# Patient Record
Sex: FEMALE | Race: WHITE | NOT HISPANIC OR LATINO | URBAN - METROPOLITAN AREA
[De-identification: names, ages, dates, MRNs, and addresses within clinical notes are randomized per-mention and may not be internally consistent; named-entity substitution may affect disease eponyms.]

---

## 2018-07-19 ENCOUNTER — EMERGENCY (EMERGENCY)
Facility: HOSPITAL | Age: 23
LOS: 1 days | Discharge: HOME | End: 2018-07-19
Attending: EMERGENCY MEDICINE | Admitting: EMERGENCY MEDICINE
Payer: COMMERCIAL

## 2018-07-19 VITALS
SYSTOLIC BLOOD PRESSURE: 146 MMHG | HEART RATE: 83 BPM | DIASTOLIC BLOOD PRESSURE: 84 MMHG | TEMPERATURE: 98 F | OXYGEN SATURATION: 100 % | RESPIRATION RATE: 16 BRPM

## 2018-07-19 PROCEDURE — 99283 EMERGENCY DEPT VISIT LOW MDM: CPT

## 2018-07-19 NOTE — ED ADULT TRIAGE NOTE - CHIEF COMPLAINT QUOTE
Pt arrives c/o Chest pain/Pressure x 2 wks, w/ intermittent severity.  Notices worse w/ activity, improves w/ rest.  Pt denies PMHx or daily meds.  [facility] which told her was "questionable" and that pt at risk for blood clots 2/2 oral contraceptives and recent long car ride (14 hr trip to [place]) and to come to ED.  Pt states pain is now radiating from L Chest into Neck.  Appearing well and in no distress. EKG in progress. Pt arrives c/o Chest pain/Pressure x 2 wks, w/ intermittent severity.  Notices worse w/ activity, improves w/ rest.  Pt denies PMHx or daily meds.  Went to [facility] and did EKG which told her was "questionable" and that pt at risk for blood clots 2/2 oral contraceptives and recent long car ride (14 hr trip to [place]) and to come to ED.  Pt states pain is now radiating from L Chest into Neck,pt thinks it is from anxiety.  Appearing well and in no distress. EKG in progress.

## 2018-07-20 NOTE — ED PROVIDER NOTE - MEDICAL DECISION MAKING DETAILS
22y/o F sent from urgent care for grossly normal EKG and L chest wall pain. Well's PE score = 0. Pt is generally asymptomatic. Non-smoker. Will recommend OTC pain medication and PMD f/u as needed. 24y/o F sent from urgent care for grossly normal EKG and L chest wall pain. Well's PE score = 0. Pt is generally asymptomatic. Non-smoker. No specific cardiac risk factors. Will recommend OTC pain medication and PMD f/u as needed.

## 2018-07-20 NOTE — ED SUB INTERN NOTE - MEDICAL DECISION MAKING DETAILS
23F w/no PMH p/w 2 weeks of chest pressure that worsens on exertion. Physical exam shows no calf swelling/tenderness, no MGR, no crackles/wheezes/rhonchi, and review of systems is negative for palpitations, or hemoptysis. Pt has a Well's score and a heart score of 0; thus, PE or any cardiac cause is unlikely at this time. Pt will receive ibuprofen for pain, and will be re-assessed.

## 2018-07-20 NOTE — ED SUB INTERN NOTE - OBJECTIVE STATEMENT FT
23F w/no PMH p/w intermittent pressure-like chest pain x 2 weeks. Pt states she was seen at an urgent care facility this am and they had concern for PE given pt's current OCP use and recent 14 hour car ride. Pt states she first noticed the pains 2 weeks ago on vacation in [place] after a night of drinking. The pain radiates to the patient's left neck, and is not associated with nausea/diaphoresis. Pt notices the pain worsens with exertion and improves with rest. Pt denies palpitations or shortness of breath, and has had no recent calf tightness or LE swelling. Pt denies hemoptysis, but states she has a dry cough and sore throat. All other review of systems negative.

## 2018-07-20 NOTE — ED PROVIDER NOTE - OBJECTIVE STATEMENT
22y/o F generally healthy, presents to the ED with L sided chest pain. Pt has been having L sided chest pain x2w since falling off of a mechanical bull while intoxicated. Her pain is generally non-exertional, non-positional, non-pleuritic, but sometimes worsening when laying on her L side. She has not taken any pain medication at home. Pt visited an urgent care tonight and was sent to the ED for "abnormal EKG" (possible ST depression 0.5mm, V4, V5, V6) due to concern for PE. Pt also drove back and forth from [place], approximately 14h of driving. Pt is on OCP. Pt is not sexually active, placed on OCP for acne. Denies fevers/chills, nausea/vomiting/diarrhea, SOB, any other complaints. NKDA. 22y/o F generally healthy, presents to the ED with L sided chest pain. Pt has been having L sided chest pain x2w since falling off of a mechanical bull while intoxicated. Her pain is generally non-exertional, non-positional, non-pleuritic, but sometimes worsening when laying on her L side. She has not taken any pain medication at home. Pt visited an urgent care tonight and was sent to the ED for "abnormal EKG" (possible ST depression 0.5mm, V4, V5, V6) due to concern for PE. Pt also drove back and forth from [place], approximately 14h of driving. Pt is on OCP. Non smoker. Pt is not sexually active, placed on OCP for acne. Denies fevers/chills, nausea/vomiting/diarrhea, SOB, any other complaints. NKDA.

## 2018-07-20 NOTE — ED PROVIDER NOTE - GASTROINTESTINAL NEGATIVE STATEMENT, MLM
no abdominal pain, no bloating, no constipation, no diarrhea, no nausea and no vomiting.
no chest pain/no palpitations/no syncope

## 2018-07-20 NOTE — ED SUB INTERN NOTE - PHYSICAL EXAMINATION
General: Young female found seated comfortably in bed in NAD. Pt speaking in full sentences.   HEENT: Clear. No JVD. No TD. No LAD. Neck supple.   Cardiac: RRR. No MGR. +S1/S2.  Lungs: CTA b/l. No wheezes, crackles, rhonchi.  GI: Abdomen soft, non-tender, non-distended. BS+.  Extremities: Warm and well-perfused. No edema in extremities. Rash on L thigh.  Neuro: No FND noted on exam  Psych: Normal mood and affect

## 2024-04-29 NOTE — ED ADULT TRIAGE NOTE - HEART RATE (BEATS/MIN)
Detail Level: Detailed
Detail Level: Zone
Detail Level: Generalized
Quality 137: Melanoma: Continuity Of Care - Recall System: Patient information entered into a recall system that includes: target date for the next exam specified AND a process to follow up with patients regarding missed or unscheduled appointments
83

## 2024-08-28 NOTE — ED SUB INTERN NOTE - CROS ED EYES ALL NEG
[FreeTextEntry1] : Patient is a 53F with PMHx COPD, HIV, and anal dysplasia who presents for follow-up. Patient previously had ascus on anal pap smear. Most recent anal pap smear one year ago showed no abnormal cells. She presents today for surveillance and repeat anal pap smear. Patient denies fevers, chills, nausea, vomiting, abdominal pain, constipation, diarrhea, blood in the stool or unexpected weight loss. Patient denies a family history of colon cancer rectal cancer or inflammatory bowel disease.  negative...